# Patient Record
Sex: MALE | Race: WHITE | NOT HISPANIC OR LATINO | Employment: FULL TIME | URBAN - METROPOLITAN AREA
[De-identification: names, ages, dates, MRNs, and addresses within clinical notes are randomized per-mention and may not be internally consistent; named-entity substitution may affect disease eponyms.]

---

## 2017-01-27 ENCOUNTER — OFFICE VISIT (OUTPATIENT)
Dept: URGENT CARE | Facility: PHYSICIAN GROUP | Age: 22
End: 2017-01-27
Payer: COMMERCIAL

## 2017-01-27 VITALS
WEIGHT: 160 LBS | BODY MASS INDEX: 21.7 KG/M2 | DIASTOLIC BLOOD PRESSURE: 60 MMHG | OXYGEN SATURATION: 93 % | HEART RATE: 100 BPM | SYSTOLIC BLOOD PRESSURE: 120 MMHG | RESPIRATION RATE: 20 BRPM | TEMPERATURE: 99 F

## 2017-01-27 DIAGNOSIS — J45.21 MILD INTERMITTENT ASTHMA WITH ACUTE EXACERBATION: ICD-10-CM

## 2017-01-27 PROCEDURE — 99213 OFFICE O/P EST LOW 20 MIN: CPT | Performed by: NURSE PRACTITIONER

## 2017-01-27 RX ORDER — ALBUTEROL SULFATE 90 UG/1
2 AEROSOL, METERED RESPIRATORY (INHALATION) EVERY 4 HOURS PRN
Qty: 1 INHALER | Refills: 1 | Status: SHIPPED | OUTPATIENT
Start: 2017-01-27 | End: 2019-06-26

## 2017-01-27 ASSESSMENT — ENCOUNTER SYMPTOMS
CHILLS: 0
SPUTUM PRODUCTION: 0
FEVER: 0
SORE THROAT: 0
SHORTNESS OF BREATH: 1
WHEEZING: 1
WEAKNESS: 0
COUGH: 0
MYALGIAS: 0

## 2017-01-27 NOTE — MR AVS SNAPSHOT
Kayden Roblero Manfred   2017 5:45 PM   Office Visit   MRN: [id number]    Department:  [facility]   Dept Phone:  [phone]    Description:  Male : 1995   Provider:  ORLY Hughes           Reason for Visit     Medication Refill needs inhaler      Allergies as of 2017     No Known Allergies      You were diagnosed with     Mild intermittent asthma with acute exacerbation   [273918]         Vital Signs     Blood Pressure Pulse Temperature Respirations Weight Oxygen Saturation    120/60 mmHg 100 37.2 °C (99 °F) 20 72.576 kg (160 lb) 93%    Smoking Status                   Never Smoker            Basic Information     Date Of Birth Sex Race Ethnicity Preferred Language    1995 Male White Non- English      Health Maintenance        Date Due Completion Dates    IMM HEP B VACCINE (1 of 3 - Primary Series) 1994 ---    IMM HEP A VACCINE (1 of 2 - Standard Series) 1995 ---    IMM HPV VACCINE (1 of 3 - Male 3 Dose Series) 2005 ---    IMM VARICELLA (CHICKENPOX) VACCINE (1 of 2 - 2 Dose Adolescent Series) 2007 ---    IMM DTaP/Tdap/Td Vaccine (1 - Tdap) 2013 ---    IMM INFLUENZA (1) 2016 ---            Current Immunizations     No immunizations on file.      Below and/or attached are the medications your provider expects you to take. Review all of your home medications and newly ordered medications with your provider and/or pharmacist. Follow medication instructions as directed by your provider and/or pharmacist. Please keep your medication list with you and share with your provider. Update the information when medications are discontinued, doses are changed, or new medications (including over-the-counter products) are added; and carry medication information at all times in the event of emergency situations     Allergies:  No Known Allergies          Medications  Valid as of: 2017 -  7:42 PM    Generic Name Brand Name Tablet Size  Instructions for use    Albuterol Sulfate (Aero Soln) albuterol 108 (90 BASE) MCG/ACT Inhale 2 Puffs by mouth every four hours as needed for Shortness of Breath.        Albuterol Sulfate (Aero Soln) albuterol 108 (90 BASE) MCG/ACT Inhale 2 Puffs by mouth every four hours as needed for Shortness of Breath.        MethylPREDNISolone (Tab) MEDROL DOSPACK 4 MG Take as directed        .                 Medicines prescribed today were sent to:     [facility] [id number] [place] [address], [place] - [address] [place] [address] AT [address] [place] [address]    Phone: [phone] Fax: [phone]    Open 24 Hours?: No      Medication refill instructions:       If your prescription bottle indicates you have medication refills left, it is not necessary to call your provider’s office. Please contact your pharmacy and they will refill your medication.    If your prescription bottle indicates you do not have any refills left, you may request refills at any time through one of the following ways: The online Intelligent Data Sensor Devices system (except Urgent Care), by calling your provider’s office, or by asking your pharmacy to contact your provider’s office with a refill request. Medication refills are processed only during regular business hours and may not be available until the next business day. Your provider may request additional information or to have a follow-up visit with you prior to refilling your medication.   *Please Note: Medication refills are assigned a new Rx number when refilled electronically. Your pharmacy may indicate that no refills were authorized even though a new prescription for the same medication is available at the pharmacy. Please request the medicine by name with the pharmacy before contacting your provider for a refill.           Intelligent Data Sensor Devices Access Code: [email or web address]  Expires: 2/26/2017  7:42 PM    Intelligent Data Sensor Devices  A secure, online tool to manage your health information     Helpjuice.com’s fitaborate  is a secure, online tool that connects you to your personalized health information from the privacy of your home -- day or night - making it very easy for you to manage your healthcare. Once the activation process is completed, you can even access your medical information using the The Bucket BBQ david, which is available for free in the Apple David store or Google Play store.     The Bucket BBQ provides the following levels of access (as shown below):   My Chart Features   [facility] Doctor [facility]  Specialists [facility]  Urgent  Care [facility]  Primary Care  Doctor   Email your healthcare team securely and privately 24/7 X X X    Manage appointments: schedule your next appointment; view details of past/upcoming appointments X      Request prescription refills. X      View recent personal medical records, including lab and immunizations X X X X   View health record, including health history, allergies, medications X X X X   Read reports about your outpatient visits, procedures, consult and ER notes X X X X   See your discharge summary, which is a recap of your hospital and/or ER visit that includes your diagnosis, lab results, and care plan. X X       How to register for The Bucket BBQ:  1. Go to  [email or web address].  2. Click on the Sign Up Now box, which takes you to the New Member Sign Up page. You will need to provide the following information:  a. Enter your The Bucket BBQ Access Code exactly as it appears at the top of this page. (You will not need to use this code after you’ve completed the sign-up process. If you do not sign up before the expiration date, you must request a new code.)   b. Enter your date of birth.   c. Enter your home email address.   d. Click Submit, and follow the next screen’s instructions.  3. Create a Blue Jeans Networkt ID. This will be your The Bucket BBQ login ID and cannot be changed, so think of one that is secure and easy to remember.  4. Create a The Bucket BBQ password. You can change your password at any  time.  5. Enter your Password Reset Question and Answer. This can be used at a later time if you forget your password.   6. Enter your e-mail address. This allows you to receive e-mail notifications when new information is available in Plandreet.  7. Click Sign Up. You can now view your health information.    For assistance activating your GenVault account, call [phone]

## 2017-01-28 NOTE — PROGRESS NOTES
Subjective:      Kayden Shearer is a 21 y.o. male who presents with Medication Refill            HPI Comments: Medications, Allergies and Prior Medical Hx reviewed and updated in [facility].with patient/family today     Patient presents with a new problem to this provider. Patient has a history of asthma. Recently he's been using his albuterol inhaler more frequently and is currently out. He does not have a primary care provider. He denies cough or productive cough. Denies fevers or chills. Denies upper respiratory symptoms.      Review of Systems   Constitutional: Negative for fever, chills and malaise/fatigue.   HENT: Negative for congestion, ear discharge, ear pain and sore throat.    Respiratory: Positive for shortness of breath (at times) and wheezing. Negative for cough and sputum production.    Musculoskeletal: Negative for myalgias.   Neurological: Negative for weakness.          Objective:     /60 mmHg  Pulse 100  Temp(Src) 37.2 °C (99 °F)  Resp 20  Wt 72.576 kg (160 lb)  SpO2 93%     Physical Exam   Constitutional: He appears well-developed and well-nourished. No distress.   HENT:   Head: Normocephalic and atraumatic.   Eyes: Conjunctivae are normal. Pupils are equal, round, and reactive to light.   Neck: Neck supple.   Cardiovascular: Normal rate, regular rhythm and normal heart sounds.    Pulmonary/Chest: Effort normal. No respiratory distress. He has wheezes (few scattered wheezes).   Neurological: He is alert.   Awake, alert, answering questions appropriately, moving all extremeties   Skin: Skin is warm and dry. No rash noted.   Psychiatric: He has a normal mood and affect. His behavior is normal.   Vitals reviewed.              Assessment/Plan:     1. Mild intermittent asthma with acute exacerbation  albuterol 108 (90 BASE) MCG/ACT Aero Soln inhalation aerosol       Discussed with the patient need to get a primary care provider for treatment of his asthma, if he continues to use his  albuterol inhaler that there are other medications that may help him decrease his asthma symptoms.    Pt will go to the ER for worsening or changing symptoms as discussed,   Follow-up with pcp at the next available appt  Discharge instructions discussed with pt/family who verbalize understanding and agreement with poc

## 2017-06-22 ENCOUNTER — OFFICE VISIT (OUTPATIENT)
Dept: URGENT CARE | Facility: PHYSICIAN GROUP | Age: 22
End: 2017-06-22
Payer: COMMERCIAL

## 2017-06-22 VITALS
DIASTOLIC BLOOD PRESSURE: 88 MMHG | HEART RATE: 110 BPM | WEIGHT: 168 LBS | HEIGHT: 72 IN | BODY MASS INDEX: 22.75 KG/M2 | OXYGEN SATURATION: 90 % | TEMPERATURE: 98.7 F | SYSTOLIC BLOOD PRESSURE: 120 MMHG

## 2017-06-22 DIAGNOSIS — R06.2 WHEEZING: ICD-10-CM

## 2017-06-22 DIAGNOSIS — J45.901 ASTHMA EXACERBATION: Primary | ICD-10-CM

## 2017-06-22 PROCEDURE — 94640 AIRWAY INHALATION TREATMENT: CPT | Performed by: PHYSICIAN ASSISTANT

## 2017-06-22 PROCEDURE — 99214 OFFICE O/P EST MOD 30 MIN: CPT | Mod: 25 | Performed by: PHYSICIAN ASSISTANT

## 2017-06-22 RX ORDER — ALBUTEROL SULFATE 90 UG/1
2 AEROSOL, METERED RESPIRATORY (INHALATION) EVERY 6 HOURS PRN
Qty: 8.5 G | Refills: 1 | Status: SHIPPED | OUTPATIENT
Start: 2017-06-22 | End: 2019-06-26

## 2017-06-22 RX ORDER — METHYLPREDNISOLONE 4 MG/1
4 TABLET ORAL DAILY
Qty: 1 KIT | Refills: 0 | Status: SHIPPED | OUTPATIENT
Start: 2017-06-22 | End: 2019-06-26

## 2017-06-22 RX ORDER — IPRATROPIUM BROMIDE AND ALBUTEROL SULFATE 2.5; .5 MG/3ML; MG/3ML
3 SOLUTION RESPIRATORY (INHALATION) ONCE
Status: COMPLETED | OUTPATIENT
Start: 2017-06-22 | End: 2017-06-22

## 2017-06-22 RX ADMIN — IPRATROPIUM BROMIDE AND ALBUTEROL SULFATE 3 ML: 2.5; .5 SOLUTION RESPIRATORY (INHALATION) at 17:45

## 2017-06-22 ASSESSMENT — ENCOUNTER SYMPTOMS
GASTROINTESTINAL NEGATIVE: 1
EYES NEGATIVE: 1
COUGH: 1
SHORTNESS OF BREATH: 1
SORE THROAT: 0
PSYCHIATRIC NEGATIVE: 1
WHEEZING: 1
CARDIOVASCULAR NEGATIVE: 1
DIZZINESS: 0
SPUTUM PRODUCTION: 0
HEADACHES: 1
CONSTITUTIONAL NEGATIVE: 1
MYALGIAS: 1

## 2017-06-22 NOTE — PATIENT INSTRUCTIONS
Nasal saline irrigation (netti pot or Sahil Med Sinus Rinse).  Used distilled water or boiled tap water with nasal flushes, not straight tap water.  Start the steroid today.  Inhaler every 4 hours as need tightness and wheeze.  Humidifier at bedtime.  Hot steam showers to loosen up mucous.  Cough medicine at bedtime.  Lots of fluids, tea with honey.  Ibuprofen for headache, fever, chills.  Be sure to take with food.  Salt water gargles.  Return if worsening: Yellow thicker mucus changes, worsening pain around the eyes and radiates to the teeth, and fever over 101°F.

## 2017-06-22 NOTE — PROGRESS NOTES
Subjective:      Kayden Shearer is a 21 y.o. male who presents with Cough            Cough  Associated symptoms include headaches, myalgias, shortness of breath and wheezing. Pertinent negatives include no sore throat.     Chief Complaint   Patient presents with   • Cough     cough, ha, sob started in AM       HPI:  Kayden Shearer is a 21 y.o. male who presents with flu like symptoms since this am while at work.  Started suddnely with SOB, headache, body aches.  No sore throat or runny nose.  Also having cough.  No albuterol inhaler.  Out of inhaler.  No fever.  Feeling really wheezy.  Patient denies HA, SOB, chest pain, palpitations, fever, chills, or n/v/d.      Past Medical History   Diagnosis Date   • Asthma        No past surgical history on file.    No family history on file.    Social History     Social History   • Marital Status: Single     Spouse Name: N/A   • Number of Children: N/A   • Years of Education: N/A     Occupational History   • Not on file.     Social History Main Topics   • Smoking status: Never Smoker    • Smokeless tobacco: Current User     Types: Chew   • Alcohol Use: No   • Drug Use: No   • Sexual Activity: Not on file     Other Topics Concern   • Not on file     Social History Narrative         Current outpatient prescriptions:   •  albuterol, 2 Puff, Inhalation, Q4HRS PRN, 6/22/2017  •  albuterol, 2 Puff, Inhalation, Q4HRS PRN, 6/22/2017  •  methylPREDNISolone, Take as directed, not taking    No Known Allergies          Review of Systems   Constitutional: Negative.    HENT: Negative for congestion and sore throat.    Eyes: Negative.    Respiratory: Positive for cough, shortness of breath and wheezing. Negative for sputum production.    Cardiovascular: Negative.    Gastrointestinal: Negative.    Genitourinary: Negative.    Musculoskeletal: Positive for myalgias.   Neurological: Positive for headaches. Negative for dizziness.   Endo/Heme/Allergies: Negative.     Psychiatric/Behavioral: Negative.           Objective:     /88 mmHg  Pulse 110  Temp(Src) 37.1 °C (98.7 °F)  Ht 1.829 m (6')  Wt 76.204 kg (168 lb)  BMI 22.78 kg/m2  SpO2 90%     Physical Exam       Nursing note and vitals reviewed.    Constitutional:   Appropriately groomed, pleasant affect, well nourished, in NAD.    Head:   Normocephalic, atraumatic.    Eyes:   PERRLA, EOM's full, sclera white, conjunctiva not erythematous, and medial canthus without exudate bilaterally.    Ears:  Auricle and tragus non-tender to manipulation.  No pre-auricular lymphadenopathy or mastoid ttp.  EACs with mild cerumen bilaterally, not erythematous.  TM’s pearly gray with cone of light present and umbo and malleolus visible bilaterally.  No bulging or fluid bubbles present in middle ear.  Hearing grossly intact to voice.    Nose:  Nares not patent bilaterally.  Nasal mucosa edematous with white rhinorrhea bilaterally.  Mild sinus tenderness to percussion.    Throat:  Dentition wnl, mucosa moist without lesions.  Oropharynx mildly erythematous, with no enlargement of the palatine tonsils bilaterally with no exudates.    Post nasal drainage  present.  Soft palate rises symmetrically bilaterally and uvula midline.      Neck: Neck supple, with mild anterior lymphadenopathy that is soft and mobile to palpation. Thyroid non-palpable without tenderness or nodules. No supraclavicular lymphadenopathy.    Lungs:  Respiratory effort not labored without accessory muscle use.  Lungs with inspiratory wheezes to auscultation. No rales. Rhonchi cleared with cough.    Heart:  RRR, without murmurs rubs or gallops.  Radial and dorsalis pedis pulse 2+ bilaterally.  No LE edema.    Musculoskeletal:  Gait non-antalgic with a narrow base.    Derm:  Skin without rashes or lesions with good turgor pressure.      Psychiatric:  Mood, affect, and judgement appropriate.     Assessment/Plan:     1. Asthma exacerbation     2. Wheezing   ipratropium-albuterol (DUONEB) nebulizer solution 3 mL    albuterol 108 (90 BASE) MCG/ACT Aero Soln inhalation aerosol    MethylPREDNISolone (MEDROL DOSEPAK) 4 MG Tablet Therapy Pack      Patient presents with asthma exacerbation since this morning with headache and body aches. No sick contacts. Patient's surgeon referral. He is a nonsmoker. Out of albuterol inhaler. On exam patient is tachycardic with a pulse oximetry of 90% room air. Lungs with significant respiratory wheeze to auscultation. No coryza or evidence of URI. Post-DuoNeb patient's oxygen increased to 94% room air with a long auscultation sounds. Not only minimal wheeze in the middle lung fields bilaterally. Patient has done well on Medrol Dosepak the past and given this prescribed methylprednisolone. Also prescribed albuterol inhaler. Reviewed signs and symptoms of bacterial infection when return to clinic. Did advise patient to return if not improving over the next several days. Would consider prednisone pulse with taper if medrol dosepak fails.    Patient was in agreement with this treatment plan and seemed to understand without barriers. All questions were encouraged and answered.  Reviewed signs and symptoms of when to seek emergency medical care.     Please note that this dictation was created using voice recognition software.  I have made every reasonable attempt to correct obvious errors, but I expect there are errors of kiesha and possibly content that I did not discover before finalizing the note.

## 2017-06-22 NOTE — MR AVS SNAPSHOT
Kayden Shearer   2017 9:00 AM   Office Visit   MRN: [id number]    Department:  [facility]   Dept Phone:  [phone]    Description:  Male : 1995   Provider:  Fabian Da Silva PA-C           Reason for Visit     Cough cough, ha, sob started in AM      Allergies as of 2017     No Known Allergies      You were diagnosed with     Asthma exacerbation   [154509]       Wheezing   [786.07.ICD-9-CM]         Vital Signs     Blood Pressure Pulse Temperature Height Weight Body Mass Index    120/88 mmHg 110 37.1 °C (98.7 °F) 1.829 m (6') 76.204 kg (168 lb) 22.78 kg/m2    Oxygen Saturation Smoking Status                90% Never Smoker           Basic Information     Date Of Birth Sex Race Ethnicity Preferred Language    1995 Male White Non- English      Health Maintenance        Date Due Completion Dates    IMM HEP B VACCINE (1 of 3 - Primary Series) 1994 ---    IMM HEP A VACCINE (1 of 2 - Standard Series) 1995 ---    IMM HPV VACCINE (1 of 3 - Male 3 Dose Series) 2005 ---    IMM VARICELLA (CHICKENPOX) VACCINE (1 of 2 - 2 Dose Adolescent Series) 2007 ---    IMM DTaP/Tdap/Td Vaccine (1 - Tdap) 2013 ---            Current Immunizations     No immunizations on file.      Below and/or attached are the medications your provider expects you to take. Review all of your home medications and newly ordered medications with your provider and/or pharmacist. Follow medication instructions as directed by your provider and/or pharmacist. Please keep your medication list with you and share with your provider. Update the information when medications are discontinued, doses are changed, or new medications (including over-the-counter products) are added; and carry medication information at all times in the event of emergency situations     Allergies:  No Known Allergies          Medications  Valid as of: 2017 -  9:45 AM    Generic Name Brand Name Tablet Size  Instructions for use    Albuterol Sulfate (Aero Soln) albuterol 108 (90 BASE) MCG/ACT Inhale 2 Puffs by mouth every four hours as needed for Shortness of Breath.        Albuterol Sulfate (Aero Soln) albuterol 108 (90 BASE) MCG/ACT Inhale 2 Puffs by mouth every four hours as needed for Shortness of Breath.        MethylPREDNISolone (Tab) MEDROL DOSPACK 4 MG Take as directed        .                 Medicines prescribed today were sent to:     [facility] [address] [place] [address] - [address] [place] [address]    Phone: [phone] Fax: [phone]    Open 24 Hours?: No      Medication refill instructions:       If your prescription bottle indicates you have medication refills left, it is not necessary to call your provider’s office. Please contact your pharmacy and they will refill your medication.    If your prescription bottle indicates you do not have any refills left, you may request refills at any time through one of the following ways: The online Fastr system (except Urgent Care), by calling your provider’s office, or by asking your pharmacy to contact your provider’s office with a refill request. Medication refills are processed only during regular business hours and may not be available until the next business day. Your provider may request additional information or to have a follow-up visit with you prior to refilling your medication.   *Please Note: Medication refills are assigned a new Rx number when refilled electronically. Your pharmacy may indicate that no refills were authorized even though a new prescription for the same medication is available at the pharmacy. Please request the medicine by name with the pharmacy before contacting your provider for a refill.        Instructions    Nasal saline irrigation (netti pot or Sahil Med Sinus Rinse).  Used distilled water or boiled tap water with nasal flushes, not straight tap water.  Start the steroid today.  Inhaler every 4 hours as need  tightness and wheeze.  Humidifier at bedtime.  Hot steam showers to loosen up mucous.  Cough medicine at bedtime.  Lots of fluids, tea with honey.  Ibuprofen for headache, fever, chills.  Be sure to take with food.  Salt water gargles.  Return if worsening: Yellow thicker mucus changes, worsening pain around the eyes and radiates to the teeth, and fever over 101°F.              INCOM Storage Access Code: [email or web address]  Expires: 7/22/2017  9:45 AM    INCOM Storage  A secure, online tool to manage your health information     [employer] INCOM Storage® is a secure, online tool that connects you to your personalized health information from the privacy of your home -- day or night - making it very easy for you to manage your healthcare. Once the activation process is completed, you can even access your medical information using the INCOM Storage david, which is available for free in the Apple David store or Google Play store.     INCOM Storage provides the following levels of access (as shown below):   My Chart Features   [facility]  Specialists [facility]  Urgent  Care [facility]  Primary Care  Doctor   Email your healthcare team securely and privately 24/7 X X X    Manage appointments: schedule your next appointment; view details of past/upcoming appointments X      Request prescription refills. X      View recent personal medical records, including lab and immunizations X X X X   View health record, including health history, allergies, medications X X X X   Read reports about your outpatient visits, procedures, consult and ER notes X X X X   See your discharge summary, which is a recap of your hospital and/or ER visit that includes your diagnosis, lab results, and care plan. X X       How to register for INCOM Storage:  1. Go to  [email or web address].  2. Click on the Sign Up Now box, which takes you to the New Member Sign Up page. You will need to provide the following information:  a. Enter your INCOM Storage Access Code exactly  as it appears at the top of this page. (You will not need to use this code after you’ve completed the sign-up process. If you do not sign up before the expiration date, you must request a new code.)   b. Enter your date of birth.   c. Enter your home email address.   d. Click Submit, and follow the next screen’s instructions.  3. Create a eduplanet KK ID. This will be your eduplanet KK login ID and cannot be changed, so think of one that is secure and easy to remember.  4. Create a Flockt password. You can change your password at any time.  5. Enter your Password Reset Question and Answer. This can be used at a later time if you forget your password.   6. Enter your e-mail address. This allows you to receive e-mail notifications when new information is available in eduplanet KK.  7. Click Sign Up. You can now view your health information.    For assistance activating your eduplanet KK account, call [phone]        Quit Tobacco Information     Do you want to quit using tobacco?    Quitting tobacco decreases risks of cancer, heart and lung disease, increases life expectancy, improves sense of taste and smell, and increases spending money, among other benefits.    If you are thinking about quitting, we can help.  • [facility] Quit Tobacco Program: 832.241.1581  o Program occurs weekly for four weeks and includes pharmacist consultation on products to support quitting smoking or chewing tobacco. A provider referral is needed for pharmacist consultation.  • Tobacco Users Help Hotline: 3-800-QUIT-NOW (972-6584) or https://[place].quitlogix.org/  o Free, confidential telephone and online coaching for [place] residents. Sessions are designed on a schedule that is convenient for you. Eligible clients receive free nicotine replacement therapy.  • Nationally: www.smokefree.gov  o Information and professional assistance to support both immediate and long-term needs as you become, and remain, a non-smoker. Smokefree.gov allows you to choose the  help that best fits your needs.

## 2018-03-01 ENCOUNTER — OFFICE VISIT (OUTPATIENT)
Dept: URGENT CARE | Facility: PHYSICIAN GROUP | Age: 23
End: 2018-03-01
Payer: COMMERCIAL

## 2018-03-01 VITALS
HEIGHT: 72 IN | BODY MASS INDEX: 22.35 KG/M2 | RESPIRATION RATE: 16 BRPM | TEMPERATURE: 99.1 F | HEART RATE: 92 BPM | OXYGEN SATURATION: 96 % | DIASTOLIC BLOOD PRESSURE: 82 MMHG | WEIGHT: 165 LBS | SYSTOLIC BLOOD PRESSURE: 122 MMHG

## 2018-03-01 DIAGNOSIS — J45.41 MODERATE PERSISTENT ASTHMA WITH EXACERBATION: ICD-10-CM

## 2018-03-01 PROCEDURE — 94761 N-INVAS EAR/PLS OXIMETRY MLT: CPT | Performed by: PHYSICIAN ASSISTANT

## 2018-03-01 PROCEDURE — 99213 OFFICE O/P EST LOW 20 MIN: CPT | Mod: 25 | Performed by: PHYSICIAN ASSISTANT

## 2018-03-01 PROCEDURE — 94640 AIRWAY INHALATION TREATMENT: CPT | Performed by: PHYSICIAN ASSISTANT

## 2018-03-01 RX ORDER — ALBUTEROL SULFATE 90 UG/1
2 AEROSOL, METERED RESPIRATORY (INHALATION) EVERY 4 HOURS PRN
Qty: 1 INHALER | Refills: 1 | Status: SHIPPED | OUTPATIENT
Start: 2018-03-01 | End: 2019-06-26

## 2018-03-01 RX ORDER — IPRATROPIUM BROMIDE AND ALBUTEROL SULFATE 2.5; .5 MG/3ML; MG/3ML
3 SOLUTION RESPIRATORY (INHALATION) ONCE
Status: COMPLETED | OUTPATIENT
Start: 2018-03-01 | End: 2018-03-01

## 2018-03-01 RX ORDER — METHYLPREDNISOLONE 4 MG/1
TABLET ORAL
Qty: 1 KIT | Refills: 0 | Status: SHIPPED | OUTPATIENT
Start: 2018-03-01 | End: 2019-06-26

## 2018-03-01 RX ADMIN — IPRATROPIUM BROMIDE AND ALBUTEROL SULFATE 3 ML: 2.5; .5 SOLUTION RESPIRATORY (INHALATION) at 18:25

## 2018-03-01 ASSESSMENT — ENCOUNTER SYMPTOMS
WHEEZING: 1
CONSTITUTIONAL NEGATIVE: 1
SHORTNESS OF BREATH: 1

## 2018-03-02 NOTE — PROGRESS NOTES
Subjective:      Kayden Shearer is a 22 y.o. male who presents with Asthma (needs inhaler refilled;states no PCP)            HPI  Chief Complaint   Patient presents with   • Asthma     needs inhaler refilled;states no PCP       HPI:  Kayden Shearer is a 22 y.o. male who presents with asthma exacerbation. Worsening chest congestion and tightness last few days. Slight runny nose but due to the cold outside. No viral upper respiratory infection. Cough is not productive. Has run out of her inhaler and has no primary care provider.  Patient denies HA, chest pain, palpitations, fever, chills, or n/v/d.      Past Medical History:   Diagnosis Date   • Asthma        History reviewed. No pertinent surgical history.    History reviewed. No pertinent family history.  No pertinent family history.    Social History     Social History   • Marital status: Single     Spouse name: N/A   • Number of children: N/A   • Years of education: N/A     Occupational History   • Not on file.     Social History Main Topics   • Smoking status: Never Smoker   • Smokeless tobacco: Current User     Types: Chew   • Alcohol use No   • Drug use: No   • Sexual activity: Yes     Partners: Female     Other Topics Concern   • Not on file     Social History Narrative   • No narrative on file         Current Outpatient Prescriptions:   •  albuterol, 2 Puff, Inhalation, Q6HRS PRN  •  MethylPREDNISolone, 4 mg, Oral, DAILY  •  albuterol, 2 Puff, Inhalation, Q4HRS PRN, 6/22/2017  •  albuterol, 2 Puff, Inhalation, Q4HRS PRN, 6/22/2017  •  methylPREDNISolone, Take as directed, not taking    Current Facility-Administered Medications:   •  ipratropium-albuterol    No Known Allergies     Review of Systems   Constitutional: Negative.    HENT: Negative.    Respiratory: Positive for shortness of breath and wheezing.    Skin: Negative.    All other systems reviewed and are negative.         Objective:     /82   Pulse 92   Temp 37.3 °C (99.1 °F)   Resp  16   Ht 1.829 m (6')   Wt 74.8 kg (165 lb)   SpO2 91%   BMI 22.38 kg/m²      Physical Exam       Nursing note and Vitals Reviewed.    Constitutional:   Appropriately groomed, pleasant affect, well nourished, in NAD.    Head:   Normocephalic, atraumatic.    Eyes:   PERRLA, EOM's full, sclera white, conjunctiva not erythematous, and medial canthus without exudate bilaterally.    Ears:  Auricle and tragus non-tender to manipulation.  No pre-auricular lymphadenopathy or mastoid ttp.  EACs with mild cerumen bilaterally, not erythematous.  TM’s pearly gray with cone of light present and umbo and malleolus visible bilaterally.  No bulging or fluid bubbles present in middle ear.  Hearing grossly intact to voice.    Nose:  Nares patent bilaterally.  Nasal mucosa edematous with yellow-white rhinorrhea bilaterally. Sinuses exquisitely tender to percussion.    Throat:  Dentition wnl, mucosa moist without lesions.  Oropharynx mildly erythematous, with no enlargement of the palatine tonsils bilaterally with no exudates.    Post nasal drainage present.  Soft palate rises symmetrically bilaterally and uvula midline.      Neck: Neck supple, with mild anterior lymphadenopathy that is soft and mobile to palpation. Thyroid non-palpable without tenderness or nodules. No supraclavicular lymphadenopathy.    Lungs:  Respiratory effort not labored without accessory muscle use.  Lungs with wheezes to auscultation bilaterally, tight sounding. No crackles or rhonchi.    Heart:  RRR, without murmurs rubs or gallops.  Radial and dorsalis pedis pulse 2+ bilaterally.  No LE edema.    Musculoskeletal:  Gait non-antalgic with a narrow base.    Derm:  Skin without rashes or lesions with good turgor pressure.      Psychiatric:  Mood, affect, and judgement appropriate.       Assessment/Plan:     1. Moderate persistent asthma with exacerbation  Patient presents with asthma exacerbation. Oxygen saturation at 91% room air. Post DuoNeb patient's oxygen  saturation improved to 96% room air, adequate. Lungs sounds dramatically improved auscultation. Prescribed Medrol Dosepak and albuterol inhaler. Also referred patient to allergist for further evaluation and management.    Patient was in agreement with this treatment plan and seemed to understand without barriers. All questions were encouraged and answered.  Reviewed signs and symptoms of when to seek emergency medical care.     Please note that this dictation was created using voice recognition software.  I have made every reasonable attempt to correct obvious errors, but I expect there are errors of kiesha and possibly content that I did not discover before finalizing the note.     - ipratropium-albuterol (DUONEB) nebulizer solution 3 mL; 3 mL by Nebulization route Once.

## 2018-06-12 ENCOUNTER — OFFICE VISIT (OUTPATIENT)
Dept: URGENT CARE | Facility: PHYSICIAN GROUP | Age: 23
End: 2018-06-12
Payer: COMMERCIAL

## 2018-06-12 VITALS
HEART RATE: 89 BPM | DIASTOLIC BLOOD PRESSURE: 70 MMHG | HEIGHT: 72 IN | WEIGHT: 170 LBS | OXYGEN SATURATION: 95 % | TEMPERATURE: 98.6 F | BODY MASS INDEX: 23.03 KG/M2 | SYSTOLIC BLOOD PRESSURE: 104 MMHG

## 2018-06-12 DIAGNOSIS — S61.219A: ICD-10-CM

## 2018-06-12 PROCEDURE — 99202 OFFICE O/P NEW SF 15 MIN: CPT | Performed by: EMERGENCY MEDICINE

## 2018-06-12 RX ORDER — SULFAMETHOXAZOLE AND TRIMETHOPRIM 800; 160 MG/1; MG/1
1 TABLET ORAL 2 TIMES DAILY
Qty: 14 TAB | Refills: 0 | Status: SHIPPED | OUTPATIENT
Start: 2018-06-12 | End: 2018-06-19

## 2018-06-12 RX ORDER — AMOXICILLIN AND CLAVULANATE POTASSIUM 875; 125 MG/1; MG/1
1 TABLET, FILM COATED ORAL 2 TIMES DAILY
Qty: 14 TAB | Refills: 0 | Status: SHIPPED | OUTPATIENT
Start: 2018-06-12 | End: 2018-06-19

## 2018-06-12 ASSESSMENT — ENCOUNTER SYMPTOMS
FOCAL WEAKNESS: 0
SENSORY CHANGE: 0
FEVER: 0
TINGLING: 0

## 2018-06-13 NOTE — PROGRESS NOTES
Subjective:      Kayden Shearer is a 22 y.o. male who presents with Laceration (Lt hand, middle finger has laceration for 4 days. Pt used super glue to close the wound, c/o pain, throbbing and swelling )          Laceration    Incident onset: 4 days ago. The laceration is located on the left hand. The laceration mechanism was a dirty knife. He reports no foreign bodies present. His tetanus status is UTD.   Patient states accidentally cut his left long finger PIP joint region with fishing knife. Appeared to be improved until today, accidentally banged against something and the wound reopened. He cleaned it with peroxide and applied superglue to the wound. Denies additional injury.      Review of Systems   Constitutional: Negative for fever.   Musculoskeletal:        No pain proximal or distal to the site.   Skin: Negative for rash.   Neurological: Negative for tingling, sensory change and focal weakness.     PMH:  has a past medical history of Asthma.  MEDS:   Current Outpatient Prescriptions:   •  amoxicillin-clavulanate (AUGMENTIN) 875-125 MG Tab, Take 1 Tab by mouth 2 times a day for 7 days., Disp: 14 Tab, Rfl: 0  •  sulfamethoxazole-trimethoprim (BACTRIM DS) 800-160 MG tablet, Take 1 Tab by mouth 2 times a day for 7 days., Disp: 14 Tab, Rfl: 0  •  albuterol 108 (90 Base) MCG/ACT Aero Soln inhalation aerosol, Inhale 2 Puffs by mouth every four hours as needed for Shortness of Breath., Disp: 1 Inhaler, Rfl: 1  •  MethylPREDNISolone (MEDROL DOSEPAK) 4 MG Tablet Therapy Pack, Take according to box instructions, Disp: 1 Kit, Rfl: 0  •  albuterol 108 (90 BASE) MCG/ACT Aero Soln inhalation aerosol, Inhale 2 Puffs by mouth every 6 hours as needed., Disp: 8.5 g, Rfl: 1  •  MethylPREDNISolone (MEDROL DOSEPAK) 4 MG Tablet Therapy Pack, Take 1 Tab by mouth every day., Disp: 1 Kit, Rfl: 0  •  albuterol 108 (90 BASE) MCG/ACT Aero Soln inhalation aerosol, Inhale 2 Puffs by mouth every four hours as needed for Shortness of  Breath., Disp: 1 Inhaler, Rfl: 1  •  albuterol (VENTOLIN OR PROVENTIL) 108 (90 BASE) MCG/ACT AERS inhalation aerosol, Inhale 2 Puffs by mouth every four hours as needed for Shortness of Breath., Disp: 1 Inhaler, Rfl: 3  •  methylPREDNISolone (MEDROL DOSPACK) 4 MG TABS, Take as directed (Patient not taking: Reported on 1/27/2017), Disp: 21 Tab, Rfl: 0  ALLERGIES: No Known Allergies  SURGHX: History reviewed. No pertinent surgical history.  SOCHX:  reports that he has never smoked. His smokeless tobacco use includes Chew. He reports that he does not drink alcohol or use drugs.  FH: family history is not on file.       Objective:     /70   Pulse 89   Temp 37 °C (98.6 °F)   Ht 1.829 m (6')   Wt 77.1 kg (170 lb)   SpO2 95%   BMI 23.06 kg/m²      Physical Exam   Constitutional: Vital signs are normal. He appears well-developed and well-nourished. He is cooperative. He does not have a sickly appearance.   Cardiovascular:   Distal capillary refill intact.   Musculoskeletal:        Hands:  Lymphadenopathy:   No lymphangitis   Neurological: He is alert.   Distal sensation to light touch and pressure intact.   Skin: Laceration noted.        Psychiatric: He has a normal mood and affect.          Less suspect for significant wound infection than unrecognized extensor tendon laceration.     Assessment/Plan:     1. Laceration of finger, left, complicated, initial encounter  Use petroleum based antibiotic ointment to remove adhesive material during wound care.  Finger splint.  - REFERRAL TO HAND SURGERY  Recheck in 2-3 days.  - amoxicillin-clavulanate (AUGMENTIN) 875-125 MG Tab; Take 1 Tab by mouth 2 times a day for 7 days.  Dispense: 14 Tab; Refill: 0  - sulfamethoxazole-trimethoprim (BACTRIM DS) 800-160 MG tablet; Take 1 Tab by mouth 2 times a day for 7 days.  Dispense: 14 Tab; Refill: 0  - DX-FINGER(S) 2+ LEFT; Future

## 2018-06-13 NOTE — PATIENT INSTRUCTIONS
Clean the area daily with mild soap and rinse well with water, pat dry with disposable paper towel.  Apply a thin film of topical antibiotic ointment twice daily until glue is dissolved.  Cover with clean dressing, such as Telfa pad, and hold in place with tape or rolled gauze.  Leave the splint in place until follow-up.  Referral provided.  Use over-the-counter pain reliever, such as acetaminophen (Tylenol), ibuprofen (Advil, Motrin) or naproxen (Aleve) as needed; follow package directions for dosing.  Use an oral probiotic daily, such as Culturelle, Align, or yogurt to reduce gastrointestinal symptoms.  Recheck with physician in 2-3 days.

## 2019-05-08 ENCOUNTER — OFFICE VISIT (OUTPATIENT)
Dept: URGENT CARE | Facility: PHYSICIAN GROUP | Age: 24
End: 2019-05-08
Payer: COMMERCIAL

## 2019-05-08 ENCOUNTER — APPOINTMENT (OUTPATIENT)
Dept: RADIOLOGY | Facility: IMAGING CENTER | Age: 24
End: 2019-05-08
Attending: NURSE PRACTITIONER
Payer: COMMERCIAL

## 2019-05-08 VITALS
WEIGHT: 176 LBS | RESPIRATION RATE: 18 BRPM | OXYGEN SATURATION: 95 % | DIASTOLIC BLOOD PRESSURE: 82 MMHG | TEMPERATURE: 101.9 F | BODY MASS INDEX: 23.87 KG/M2 | HEART RATE: 114 BPM | SYSTOLIC BLOOD PRESSURE: 112 MMHG

## 2019-05-08 DIAGNOSIS — J20.9 ACUTE BRONCHITIS, UNSPECIFIED ORGANISM: ICD-10-CM

## 2019-05-08 DIAGNOSIS — J45.20 MILD INTERMITTENT ASTHMA, UNSPECIFIED WHETHER COMPLICATED: ICD-10-CM

## 2019-05-08 DIAGNOSIS — R50.9 FEVER AND CHILLS: ICD-10-CM

## 2019-05-08 LAB
FLUAV+FLUBV AG SPEC QL IA: NORMAL
INT CON NEG: NORMAL
INT CON NEG: NORMAL
INT CON POS: NORMAL
INT CON POS: NORMAL
S PYO AG THROAT QL: NORMAL

## 2019-05-08 PROCEDURE — 99214 OFFICE O/P EST MOD 30 MIN: CPT | Performed by: NURSE PRACTITIONER

## 2019-05-08 PROCEDURE — 71046 X-RAY EXAM CHEST 2 VIEWS: CPT | Mod: TC,FY | Performed by: NURSE PRACTITIONER

## 2019-05-08 PROCEDURE — 87804 INFLUENZA ASSAY W/OPTIC: CPT | Performed by: NURSE PRACTITIONER

## 2019-05-08 PROCEDURE — 87880 STREP A ASSAY W/OPTIC: CPT | Performed by: NURSE PRACTITIONER

## 2019-05-08 RX ORDER — PREDNISONE 20 MG/1
TABLET ORAL
Qty: 10 TAB | Refills: 0 | Status: SHIPPED | OUTPATIENT
Start: 2019-05-08 | End: 2019-06-26

## 2019-05-08 RX ORDER — DOXYCYCLINE HYCLATE 100 MG/1
100 CAPSULE ORAL 2 TIMES DAILY
Qty: 14 CAP | Refills: 0 | Status: SHIPPED | OUTPATIENT
Start: 2019-05-08 | End: 2019-05-15

## 2019-05-08 ASSESSMENT — ENCOUNTER SYMPTOMS
COUGH: 1
WHEEZING: 1
NAUSEA: 0
ORTHOPNEA: 0
MYALGIAS: 1
SPUTUM PRODUCTION: 0
DIZZINESS: 1
DIARRHEA: 0
HEADACHES: 1
CHILLS: 1
SORE THROAT: 1
FEVER: 1
SHORTNESS OF BREATH: 1
EYE DISCHARGE: 0

## 2019-05-08 NOTE — LETTER
May 8, 2019        Kayden Shearer  [address]  [place] [address]        Kayden was seen in our clinic today and he is excused from work for Tuesday-Friday due to illness.  Thank you.  If you have any questions or concerns, please don't hesitate to call.        Sincerely,        ALICIA Andres.[email or web address].    Electronically Signed

## 2019-05-08 NOTE — PROGRESS NOTES
Subjective:      Kayden Shearer is a 23 y.o. male who presents with Fever (x3d/); Dizziness; and Headache            HPI New. 23 year old male with fever, chills, myalgia, dizziness and headache. He denies any nausea or diarrhea. He does have cough and congestion as well as sore throat. Some shortness of breath but none today. Denies any chest or back pain. +wheezing (he has asthma). No rashes or urinary symptoms at this time. Wife ill with similar symptoms but he states his is worse.  Patient has no known allergies.  Current Outpatient Prescriptions on File Prior to Visit   Medication Sig Dispense Refill   • albuterol 108 (90 Base) MCG/ACT Aero Soln inhalation aerosol Inhale 2 Puffs by mouth every four hours as needed for Shortness of Breath. 1 Inhaler 1   • MethylPREDNISolone (MEDROL DOSEPAK) 4 MG Tablet Therapy Pack Take according to box instructions (Patient not taking: Reported on 5/8/2019) 1 Kit 0   • albuterol 108 (90 BASE) MCG/ACT Aero Soln inhalation aerosol Inhale 2 Puffs by mouth every 6 hours as needed. 8.5 g 1   • MethylPREDNISolone (MEDROL DOSEPAK) 4 MG Tablet Therapy Pack Take 1 Tab by mouth every day. (Patient not taking: Reported on 5/8/2019) 1 Kit 0   • albuterol 108 (90 BASE) MCG/ACT Aero Soln inhalation aerosol Inhale 2 Puffs by mouth every four hours as needed for Shortness of Breath. 1 Inhaler 1   • albuterol (VENTOLIN OR PROVENTIL) 108 (90 BASE) MCG/ACT AERS inhalation aerosol Inhale 2 Puffs by mouth every four hours as needed for Shortness of Breath. 1 Inhaler 3   • methylPREDNISolone (MEDROL DOSPACK) 4 MG TABS Take as directed (Patient not taking: Reported on 1/27/2017) 21 Tab 0     No current facility-administered medications on file prior to visit.      Social History     Social History   • Marital status: Single     Spouse name: N/A   • Number of children: N/A   • Years of education: N/A     Occupational History   • Not on file.     Social History Main Topics   • Smoking status:  Never Smoker   • Smokeless tobacco: Current User     Types: Chew   • Alcohol use No   • Drug use: No   • Sexual activity: Yes     Partners: Female     Other Topics Concern   • Not on file     Social History Narrative   • No narrative on file     family history is not on file.      Review of Systems   Constitutional: Positive for chills, fever and malaise/fatigue.   HENT: Positive for congestion and sore throat.    Eyes: Negative for discharge.   Respiratory: Positive for cough, shortness of breath and wheezing. Negative for sputum production.    Cardiovascular: Negative for chest pain and orthopnea.   Gastrointestinal: Negative for diarrhea and nausea.   Musculoskeletal: Positive for myalgias.   Neurological: Positive for dizziness and headaches.   Endo/Heme/Allergies: Negative for environmental allergies.          Objective:     /82   Pulse (!) 114   Temp (!) 38.8 °C (101.9 °F) (Temporal)   Resp 18   Wt 79.8 kg (176 lb)   SpO2 95%   BMI 23.87 kg/m²      Physical Exam   Constitutional: He is oriented to person, place, and time. He appears well-developed and well-nourished. He appears ill. No distress.   HENT:   Head: Normocephalic and atraumatic.   Right Ear: External ear and ear canal normal. Tympanic membrane is not injected and not perforated. No middle ear effusion.   Left Ear: External ear and ear canal normal. Tympanic membrane is not injected and not perforated.  No middle ear effusion.   Nose: Mucosal edema present.   Mouth/Throat: Posterior oropharyngeal erythema present. No oropharyngeal exudate.   Eyes: Conjunctivae are normal. Right eye exhibits no discharge. Left eye exhibits no discharge.   Neck: Normal range of motion. Neck supple.   Cardiovascular: Normal rate, regular rhythm and normal heart sounds.    No murmur heard.  Pulmonary/Chest: Effort normal. No respiratory distress. He has wheezes.   Musculoskeletal: Normal range of motion.   Normal movement of all 4 extremities.    Lymphadenopathy:     He has no cervical adenopathy.        Right: No supraclavicular adenopathy present.        Left: No supraclavicular adenopathy present.   Neurological: He is alert and oriented to person, place, and time. Gait normal.   Skin: Skin is warm and dry.   Psychiatric: He has a normal mood and affect. His behavior is normal. Thought content normal.   Nursing note and vitals reviewed.              Assessment/Plan:     1. Acute bronchitis, unspecified organism  doxycycline (VIBRAMYCIN) 100 MG Cap    predniSONE (DELTASONE) 20 MG Tab   2. Mild intermittent asthma, unspecified whether complicated     3. Fever and chills  POCT Influenza A/B    POCT Rapid Strep A    DX-CHEST-2 VIEWS     Strep and flu negative.  X-ray negative  Likely viral with bronchitis component.  Will cover with doxy as he does have fever and asthma diagnosis.  Differential diagnosis, natural history, supportive care, and indications for immediate follow-up discussed at length.

## 2019-06-26 ENCOUNTER — OFFICE VISIT (OUTPATIENT)
Dept: URGENT CARE | Facility: CLINIC | Age: 24
End: 2019-06-26
Payer: COMMERCIAL

## 2019-06-26 VITALS
HEART RATE: 100 BPM | DIASTOLIC BLOOD PRESSURE: 72 MMHG | RESPIRATION RATE: 14 BRPM | OXYGEN SATURATION: 98 % | WEIGHT: 176.37 LBS | HEIGHT: 71 IN | BODY MASS INDEX: 24.69 KG/M2 | TEMPERATURE: 98.6 F | SYSTOLIC BLOOD PRESSURE: 110 MMHG

## 2019-06-26 DIAGNOSIS — L72.9 CYST OF SKIN: ICD-10-CM

## 2019-06-26 PROCEDURE — 99214 OFFICE O/P EST MOD 30 MIN: CPT | Performed by: FAMILY MEDICINE

## 2019-06-26 RX ORDER — DOXYCYCLINE HYCLATE 100 MG
100 TABLET ORAL 2 TIMES DAILY
Qty: 20 TAB | Refills: 0 | Status: SHIPPED | OUTPATIENT
Start: 2019-06-26 | End: 2019-07-06

## 2019-06-26 NOTE — PATIENT INSTRUCTIONS
Start Doxycycline twice daily as directed for 10 days. Make sure you stay up right for at least 2 hours after each oral antibiotic use  Warm compresses help[  Follow up if not significantly improved as expected in 3-4 days, sooner if any worsening or new symptoms

## 2019-06-26 NOTE — PROGRESS NOTES
"Subjective:      Kayden Shearer is a 23 y.o. male who presents with Cyst (x 2 days cyst on neck)            This is a new problem.  23-year-old presenting for evaluation of persistent is noticed in the left side of the neck anteriorly for the past couple days.  No fever or chills reported.  Patient does note remote history of acne.  Pertinent review of system otherwise negative        ROS       Objective:     /72 (BP Location: Left arm, Patient Position: Sitting, BP Cuff Size: Large adult)   Pulse 100   Temp 37 °C (98.6 °F) (Temporal)   Resp 14   Ht 1.803 m (5' 11\")   Wt 80 kg (176 lb 5.9 oz)   SpO2 98%   BMI 24.60 kg/m²      Physical Exam   Constitutional: He is oriented to person, place, and time. He appears well-developed and well-nourished.  Non-toxic appearance. No distress.   HENT:   Head: Normocephalic and atraumatic.       Right Ear: External ear normal.   Left Ear: External ear normal.   Nose: Nose normal.   Small 1 cm cystic lesion noted underneath the skin over the outline area, no surrounding erythema, no erythema, no drainage or fluctuation.  The cyst seem to be firm  Some acneiform lesions scattered noted also over the forehead area and face   Eyes: Conjunctivae are normal.   Cardiovascular: Normal rate.    Pulmonary/Chest: Effort normal. No stridor. No respiratory distress.   Neurological: He is alert and oriented to person, place, and time.   Skin: Skin is warm. He is not diaphoretic. No pallor.   Psychiatric: He has a normal mood and affect.               Assessment/Plan:     1. Cyst of skin  - doxycycline (VIBRAMYCIN) 100 MG Tab; Take 1 Tab by mouth 2 times a day for 10 days.  Dispense: 20 Tab; Refill: 0    Cyst is firm and not right for any I&D. ?  Cystic acne versus inflamed sebaceous cyst  We discussed warm compresses, avoiding manipulation, starting oral antibiotic and follow-up in the next few days if not significantly better, sooner if any worsening.Plan per orders and " instructions  Warning signs reviewed